# Patient Record
Sex: FEMALE | Race: WHITE | NOT HISPANIC OR LATINO | Employment: FULL TIME | URBAN - METROPOLITAN AREA
[De-identification: names, ages, dates, MRNs, and addresses within clinical notes are randomized per-mention and may not be internally consistent; named-entity substitution may affect disease eponyms.]

---

## 2018-07-25 ENCOUNTER — APPOINTMENT (EMERGENCY)
Dept: RADIOLOGY | Facility: HOSPITAL | Age: 38
End: 2018-07-25
Payer: COMMERCIAL

## 2018-07-25 ENCOUNTER — HOSPITAL ENCOUNTER (EMERGENCY)
Facility: HOSPITAL | Age: 38
Discharge: HOME | End: 2018-07-25
Attending: EMERGENCY MEDICINE | Admitting: EMERGENCY MEDICINE
Payer: COMMERCIAL

## 2018-07-25 VITALS
DIASTOLIC BLOOD PRESSURE: 83 MMHG | SYSTOLIC BLOOD PRESSURE: 146 MMHG | WEIGHT: 155 LBS | OXYGEN SATURATION: 99 % | HEIGHT: 62 IN | HEART RATE: 118 BPM | RESPIRATION RATE: 20 BRPM | BODY MASS INDEX: 28.52 KG/M2 | TEMPERATURE: 99.2 F

## 2018-07-25 DIAGNOSIS — S67.21XA CRUSHING INJURY OF RIGHT HAND: Primary | ICD-10-CM

## 2018-07-25 PROCEDURE — 99283 EMERGENCY DEPT VISIT LOW MDM: CPT

## 2018-07-25 PROCEDURE — 73140 X-RAY EXAM OF FINGER(S): CPT

## 2018-07-25 NOTE — ED PROVIDER NOTES
History  Chief Complaint   Patient presents with    Finger Injury     Patient states she got her R hand caught in the car door  Patient staes her 2nd and 3rd finger ont he R hand      This is a 26-year-old female who presents with pain and swelling to the right 2nd and 3rd fingers after getting it stuck in a car door there is a small laceration which does not require suturing gross sensation and movement is intact and her last tetanus is within 10 years        History provided by:  Patient  Injury   Location:   right 2nd and 3rd fingers  Quality:   crush injury  Severity:  Moderate  Onset quality:  Sudden  Timing:  Constant  Progression:  Unchanged  Chronicity:  New  Context:   crush injury from car door  Relieved by:   nothing  Worsened by:   movement      None       History reviewed  No pertinent past medical history  Past Surgical History:   Procedure Laterality Date    MYRINGOTOMY W/ TUBES         History reviewed  No pertinent family history  I have reviewed and agree with the history as documented  Social History   Substance Use Topics    Smoking status: Current Every Day Smoker    Smokeless tobacco: Never Used    Alcohol use No        Review of Systems   Musculoskeletal:        Right hand injury   All other systems reviewed and are negative  Physical Exam  Physical Exam   Constitutional: She is oriented to person, place, and time  She appears well-developed and well-nourished  No distress  HENT:   Head: Normocephalic and atraumatic  Right Ear: External ear normal    Left Ear: External ear normal    Mouth/Throat: Oropharynx is clear and moist    Eyes: EOM are normal  Pupils are equal, round, and reactive to light  No scleral icterus  Neck: Neck supple  No tracheal deviation present  Cardiovascular: Regular rhythm and intact distal pulses  Exam reveals no gallop and no friction rub  No murmur heard    Tachycardic   Pulmonary/Chest: Effort normal and breath sounds normal  No stridor  No respiratory distress  She has no wheezes  Abdominal: Soft  Bowel sounds are normal  She exhibits no distension  There is no tenderness  Musculoskeletal: She exhibits edema and tenderness  She exhibits no deformity  Swelling and tenderness to the mid phalanx of the right 2nd and 3rd fingers with decreased range of motion secondary to pain but gross sensation intact   Neurological: She is alert and oriented to person, place, and time  No cranial nerve deficit  Skin: Skin is warm and dry  She is not diaphoretic  Small abrasions//laceration dorsal aspect of right 2nd and 3rd fingers which does not require suturing   Psychiatric: She has a normal mood and affect  Her behavior is normal  Thought content normal    Nursing note and vitals reviewed        Vital Signs  ED Triage Vitals [07/25/18 1750]   Temperature Pulse Respirations Blood Pressure SpO2   99 2 °F (37 3 °C) (!) 118 20 146/83 99 %      Temp src Heart Rate Source Patient Position - Orthostatic VS BP Location FiO2 (%)   -- -- -- -- --      Pain Score       3           Vitals:    07/25/18 1750   BP: 146/83   Pulse: (!) 118       Visual Acuity      ED Medications  Medications - No data to display    Diagnostic Studies  Results Reviewed     None                 XR finger second digit-index RIGHT    (Results Pending)   XR finger third digit-middle RIGHT    (Results Pending)              Procedures  Procedures       Phone Contacts  ED Phone Contact    ED Course  ED Course as of Jul 25 1900 Wed Jul 25, 2018   3075  X-rays reviewed by me does not reveal any acute fracture or dislocation                                [facility]  CritCare Time    Disposition  Final diagnoses:   Crushing injury of right hand - 2nd and 3rd fingers from car door     Time reflects when diagnosis was documented in both MDM as applicable and the Disposition within this note     Time User Action Codes Description Comment    7/25/2018  6:58 PM Letty Rodriguez Crushing injury of right hand     7/25/2018  6:59 PM Citlalli Santana Modify [D10 [id number]] Crushing injury of right hand 2nd and 3rd fingers from car door      ED Disposition     ED Disposition Condition Comment    Discharge  Polina Fischer discharge to home/self care  Condition at discharge: Stable        Follow-up Information     Follow up With Specialties Details Why Contact Info    Shantelle Cabrales MD  In 1 week As needed [address]  [address]  [address] [facility] [address]  [phone]            Patient's Medications    No medications on file     No discharge procedures on file      ED Provider  Electronically Signed by           Angie Patterson DO  07/25/18 1527

## 2018-07-25 NOTE — ED NOTES
Patient has two small lacerations on the posterior side of her R index finger and small open area on the 3rd finger anteriorly and posteriorly     Ashley Lizarraga RN  07/25/18 9962

## 2018-07-25 NOTE — DISCHARGE INSTRUCTIONS
Crush Injury   WHAT YOU NEED TO KNOW:   A crush injury happens when part of your body is trapped under a heavy object, or trapped between objects  You may have one or more broken bones  You may also have tissue damage  The damage can cause pain, numbness, and weakness  A crush injury can cause serious problems that need immediate treatment  DISCHARGE INSTRUCTIONS:   Medicines: You may need any of the following:  · Prescription pain medicine  may be given  Ask how to take this medicine safely  · Antibiotics  prevent or fight a bacterial infection  · Take your medicine as directed  Contact your healthcare provider if you think your medicine is not helping or if you have side effects  Tell him of her if you are allergic to any medicine  Keep a list of the medicines, vitamins, and herbs you take  Include the amounts, and when and why you take them  Bring the list or the pill bottles to follow-up visits  Carry your medicine list with you in case of an emergency  Call 911 for any of the following:   · You have chest pain, shortness of breath, or cannot think clearly  Return to the emergency department if:   · The skin near the injured area turns blue or white or feels cold and numb  · You feel pain that increases when you stretch or bend the injured area  · The injured area swells or feels tight or hard  · You have pale or shiny skin near your injury  · You have numbness or trouble moving your injured arm or leg  · Your wound is draining pus or smells bad  · Your pain or swelling does not go away or gets worse, even after you take medicine  · Blood soaks through your bandage or cast   Contact your healthcare provider if:   · You have questions or concerns about your condition or care  Follow up with your healthcare provider as directed: You may need more x-rays, or a cast for a broken bone  You may also need treatment for muscle, nerve, or kidney damage   Your healthcare provider may refer you to an orthopedic surgeon or other specialist  Write down your questions so you remember to ask them during your visits  Apply ice:  Ice helps decrease pain and swelling  Ice may also help decrease tissue damage  Use an ice pack, or put crushed ice in a plastic bag  Cover it with a towel  Apply it to the injured area for 20 minutes every hour, or as directed  Ask your healthcare provider how many times each day to apply ice, and for how many days  Elevate the injured area as directed: If possible, raise the area as often as you can  This will help decrease swelling and pain  Prop it on pillows to keep it elevated comfortably  Do not smoke:  Smoking can cause tissue damage and delay healing  Ask your healthcare provider for more information if you currently smoke and need help quitting  Go to therapy as directed:  A physical therapist can teach you exercises to help improve movement and strength  Physical therapy can also help decrease pain and loss of function  An occupational therapist can help you find ways to do daily activities and care for yourself  © 2017 [facility] Information is for End User's use only and may not be sold, redistributed or otherwise used for commercial purposes  All illustrations and images included in CareNotes® are the copyrighted property of [employer]  or [employer]  The above information is an  only  It is not intended as medical advice for individual conditions or treatments  Talk to your doctor, nurse or pharmacist before following any medical regimen to see if it is safe and effective for you

## 2021-03-18 ENCOUNTER — TELEPHONE (OUTPATIENT)
Dept: OTHER | Facility: OTHER | Age: 41
End: 2021-03-18

## 2021-03-18 NOTE — TELEPHONE ENCOUNTER
Patient was given activation code for My Chart so that she can get proxy access to her daughter's COVID-19 test results

## 2021-03-25 ENCOUNTER — NURSE TRIAGE (OUTPATIENT)
Dept: OTHER | Facility: OTHER | Age: 41
End: 2021-03-25

## 2021-03-25 DIAGNOSIS — Z20.828 SARS-ASSOCIATED CORONAVIRUS EXPOSURE: Primary | ICD-10-CM

## 2021-03-25 DIAGNOSIS — Z20.828 SARS-ASSOCIATED CORONAVIRUS EXPOSURE: ICD-10-CM

## 2021-03-25 PROCEDURE — U0003 INFECTIOUS AGENT DETECTION BY NUCLEIC ACID (DNA OR RNA); SEVERE ACUTE RESPIRATORY SYNDROME CORONAVIRUS 2 (SARS-COV-2) (CORONAVIRUS DISEASE [COVID-19]), AMPLIFIED PROBE TECHNIQUE, MAKING USE OF HIGH THROUGHPUT TECHNOLOGIES AS DESCRIBED BY CMS-2020-01-R: HCPCS | Performed by: FAMILY MEDICINE

## 2021-03-25 PROCEDURE — U0005 INFEC AGEN DETEC AMPLI PROBE: HCPCS | Performed by: FAMILY MEDICINE

## 2021-03-25 NOTE — TELEPHONE ENCOUNTER
Regarding: asymptomatic-covid test-exposure  ----- Message from Roxi Diaz sent at 3/25/2021  2:59 PM EDT -----  "I have no current symptoms    My niece, who is in my care, just tested positive "

## 2021-03-25 NOTE — TELEPHONE ENCOUNTER
Reason for Disposition   [1] CLOSE CONTACT COVID-19 EXPOSURE within last 14 days AND [2] NO symptoms    Answer Assessment - Initial Assessment Questions  1  COVID-19 CLOSE CONTACT: "Who is the person with the confirmed or suspected COVID-19 infection that you were exposed to?"      Niece       2  PLACE of CONTACT: "Where were you when you were exposed to COVID-19?" (e g , home, school, medical waiting room; which city?)      Home niece resides with patient     3  TYPE of CONTACT: "How much contact was there?" (e g , sitting next to, live in same house, work in same office, same building)      Lives in [address] [place] [address]    4  DURATION of CONTACT: "How long were you in contact with the COVID-19 patient?" (e g , a few seconds, passed by person, a few minutes, 15 minutes or longer, live with the patient)      Lives with     5  MASK: "Were you wearing a mask?" "Was the other person wearing a mask?" Note: wearing a mask reduces the risk of an otherwise close contact  Not wearing a mask    6  DATE of CONTACT: "When did you have contact with a COVID-19 patient?" (e g , how many days ago)      03/23    8  SYMPTOMS: "Do you have any symptoms?" (e g , fever, cough, breathing difficulty, loss of taste or smell)      No    9  PREGNANCY OR POSTPARTUM: "Is there any chance you are pregnant?" "When was your last menstrual period?" "Did you deliver in the last 2 weeks?"      No    10  HIGH RISK: "Do you have any heart or lung problems?  Do you have a weak immune system?" (e g , heart failure, COPD, asthma, HIV positive, chemotherapy, renal failure, diabetes mellitus, sickle cell anemia, obesity)        Asthma    Protocols used: CORONAVIRUS (COVID-19) EXPOSURE-ADULT-AH

## 2021-03-26 LAB — SARS-COV-2 RNA RESP QL NAA+PROBE: NEGATIVE

## 2024-11-11 ENCOUNTER — HOSPITAL ENCOUNTER (OUTPATIENT)
Dept: HOSPITAL 99 - WDC | Age: 44
End: 2024-11-11
Payer: COMMERCIAL

## 2024-11-11 DIAGNOSIS — Z12.31: Primary | ICD-10-CM
